# Patient Record
Sex: MALE | Race: WHITE | NOT HISPANIC OR LATINO | Employment: STUDENT | URBAN - METROPOLITAN AREA
[De-identification: names, ages, dates, MRNs, and addresses within clinical notes are randomized per-mention and may not be internally consistent; named-entity substitution may affect disease eponyms.]

---

## 2024-03-25 ENCOUNTER — OFFICE VISIT (OUTPATIENT)
Dept: ORTHOPEDIC SURGERY | Facility: CLINIC | Age: 24
End: 2024-03-25
Payer: COMMERCIAL

## 2024-03-25 VITALS — HEIGHT: 62 IN | WEIGHT: 120 LBS | BODY MASS INDEX: 22.08 KG/M2

## 2024-03-25 DIAGNOSIS — R29.898 TRANSIENT WEAKNESS OF LOWER EXTREMITY: Primary | ICD-10-CM

## 2024-03-25 DIAGNOSIS — G24.9 DYSTONIA: ICD-10-CM

## 2024-03-25 PROCEDURE — 99203 OFFICE O/P NEW LOW 30 MIN: CPT | Performed by: FAMILY MEDICINE

## 2024-03-25 PROCEDURE — 1036F TOBACCO NON-USER: CPT | Performed by: FAMILY MEDICINE

## 2024-03-25 NOTE — PROGRESS NOTES
History of Present Illness   Chief Complaint   Patient presents with    Left Lower Leg - Extremity Weakness     Noticed coordination is off with running, feels his stride is off     Right Lower Leg - Extremity Weakness     Noticed coordination is off with running, feels his stride is off        The patient is 24 y.o. male  here with a complaint of bilateral lower extremity complaints with running.  Patient is a avid runner, runs anywhere from 60 to 80 miles per week, has ran multiple marathons, says that he finished 150th of 49k runners in [place] marathon in the fall.  He says while he was training for this race on occasion he would get vague feelings of discoordination, limb twitching and spasming when he was running at his marathon pace for an extended period of time.  He said that he completed the marathon without any issues, had similar more sporadic symptoms in the winter but nothing consistent.  He says since the middle of February his symptoms have now become more consistent's, anytime that he runs a sub-6-minute mile he will start to develop symptoms, initially it was after 6 to 8 miles but now he says that after a mile or so he will get onset of symptoms within a mile or so of running at his faster pace.  He denies any pain at rest or other symptoms, he denies any muscle fasciculations at rest.  He was seen at his [facility], he is in medical school at [employer], they recommended outpatient orthopedic follow-up.    No past medical history on file.    Medication Documentation Review Audit    **Prior to Admission medications have not yet been reviewed**         No Known Allergies    Social History     Socioeconomic History    Marital status: Single     Spouse name: Not on file    Number of children: Not on file    Years of education: Not on file    Highest education level: Not on file   Occupational History    Not on file   Tobacco Use    Smoking status: Never    Smokeless tobacco: Never   Substance  and Sexual Activity    Alcohol use: Never    Drug use: Never    Sexual activity: Not on file   Other Topics Concern    Not on file   Social History Narrative    Not on file     Social Determinants of Health     Financial Resource Strain: Not on file   Food Insecurity: Not on file   Transportation Needs: Not on file   Physical Activity: Not on file   Stress: Not on file   Social Connections: Not on file   Intimate Partner Violence: Not on file   Housing Stability: Not on file       No past surgical history on file.       Review of Systems   GENERAL: Negative  GI: Negative  MUSCULOSKELETAL: See HPI  SKIN: Negative  NEURO:  Negative     Physical Exam:    General/Constitutional: well appearing, no distress, appears stated age  HEENT: sclera clear  Respiratory: non labored breathing  Vascular: No edema, swelling or tenderness, except as noted in detailed exam.  Integumentary: No impressive skin lesions present, except as noted in detailed exam.  Neurological:  Alert and oriented   Psychological:  Normal mood and affect.  Musculoskeletal: Normal, except as noted in detailed exam and in HPI.  Normal gait, unassisted    Bilateral lower extremity exam reveals normal-appearing bilateral lower extremities, there is normal muscle tone, no atrophy, no muscle fasciculations, there is no areas of tenderness to palpation at either lower extremity.  There is no lower extremity pathologic reflexes, there is no notable ligamentous laxity at either knee, no motor deficits or pain with strength testing at the knees hips or ankles.  He has sensation intact to light touch throughout bilateral lower extremities.       Imaging imaging today      Assessment   1. Transient weakness of lower extremity  EMG & nerve conduction      2. Dystonia  EMG & nerve conduction        Lower extremity muscle dystonia/weakness exacerbated by running at high pace for long distances, discussed possibility of runners dystonia as diagnosis versus muscle  imbalance related to gait    Plan: Discussed differential diagnosis, further workup and treatment.  I would like to obtain an EMG to evaluate for any muscle/nerve pathology that could be contributing to his symptoms.  In the meantime he will continue to avoid running at a pace that may aggravate his symptoms.  We discussed possibility of gait analysis as next steps in management pending EMG results

## 2024-04-03 ENCOUNTER — HOSPITAL ENCOUNTER (OUTPATIENT)
Dept: NEUROLOGY | Facility: HOSPITAL | Age: 24
Discharge: HOME | End: 2024-04-03
Payer: COMMERCIAL

## 2024-04-03 DIAGNOSIS — G24.9 DYSTONIA: ICD-10-CM

## 2024-04-03 DIAGNOSIS — R29.898 TRANSIENT WEAKNESS OF LOWER EXTREMITY: ICD-10-CM

## 2024-04-03 DIAGNOSIS — R29.898 TRANSIENT WEAKNESS OF LOWER EXTREMITY: Primary | ICD-10-CM

## 2024-04-03 PROCEDURE — 95910 NRV CNDJ TEST 7-8 STUDIES: CPT | Performed by: STUDENT IN AN ORGANIZED HEALTH CARE EDUCATION/TRAINING PROGRAM

## 2024-04-03 PROCEDURE — 95886 MUSC TEST DONE W/N TEST COMP: CPT | Performed by: STUDENT IN AN ORGANIZED HEALTH CARE EDUCATION/TRAINING PROGRAM

## 2024-04-09 ENCOUNTER — EVALUATION (OUTPATIENT)
Dept: PHYSICAL THERAPY | Facility: CLINIC | Age: 24
End: 2024-04-09
Payer: COMMERCIAL

## 2024-04-09 DIAGNOSIS — R29.898 WEAKNESS OF LEFT HIP: ICD-10-CM

## 2024-04-09 DIAGNOSIS — G24.9 DYSTONIA: ICD-10-CM

## 2024-04-09 DIAGNOSIS — R29.898 TRANSIENT WEAKNESS OF LOWER EXTREMITY: Primary | ICD-10-CM

## 2024-04-09 PROCEDURE — 97161 PT EVAL LOW COMPLEX 20 MIN: CPT | Mod: GP | Performed by: PHYSICAL THERAPIST

## 2024-04-09 NOTE — PROGRESS NOTES
Physical Therapy Orthopedic Examination Note    Patient Name: James Martinez  MRN Number: [id number]  Initial Examination Date:  Referring Clinician:  Reason for Visit:    Time Calculation  Start Time: 0402  Stop Time: 0440  Time Calculation (min): 38 min    Insurance  Visit Number: 1   Approved Visits:  Certification/ POC Period:  Coverage: Payor: AETNA / Plan: AETNA  HEALTHCARE / Product Type: *No Product type* /     Precautions/ History      Problem List  1. Transient weakness of lower extremity  Referral to Physical Therapy    Follow Up In Physical Therapy      2. Dystonia  Referral to Physical Therapy    Follow Up In Physical Therapy      3. Weakness of left hip          Subjective  Nerve conduction study was done last week, unremarkable. Started noticing issues with coordination with lower extremities while running and has been an issue for the last 2 months. 60-65 miles a weeks is his normal run. At faster paces he notices issues with his running (dystonia). It used to be after 13 miles of running, now it happens at a mile or less every time he runs at faster paces. Leg bumps into the other one with random contraction of the hip flexors, his ankle has a lack of coordination. Does not feel unstable or weak, has not fallen from this. 10-30 seconds of a rest and the symptoms go away and he can resume running. Symptoms do not seem neurological. 7 or so hours of sleep a night right now. Does try to keep a healthy diet. Denies pain, numbness in his legs. Occasional tingling but not really. He is doing weights about 6 days a week, on the 7th day he does primarily upper body. Approximately 6 weeks ago he started doing weekly lower body strengthening one time a week.     Aggravating Factors: Cannot pinpoint anything that makes it better or worse.  Relieving Factors: Trail running.     Pain scale:   0/10 maximum in the last week    Inspection  Treadmill Running Screen, speed 10.5 mph, 8 min of running.      Objective    Hip Strength Testing  Left  Right  Comments  Flexion   5/5  5/5  Extension  5/5  5/5  Abduction  4+/5  5/5  Adduction  4+/5  5/5    Assessment  Patient presents to physical therapy with concerns for lower extremity dystonia which occurs after running for short durations of time, this was previously an issue but took much longer to present itself while running.  Patient will be referred to a colleague for additional running assessment and therapeutic exercises to address hip weakness.    Problem List  - Difficulties running  - left hip weakness    Plan  Planned interventions include: Therapeutic exercise  and Gait Training. Neuromuscular Re-education. Running screen/ training. Left hip strengthening.     1 Visits/ Week for 5-6 Visits    Goals  - Full return to running without difficulty or dystonic movement  - Increase left hip strength testing equal to 5/5 for improvements in LE stability when running for extended periods of time.      Plan developed in agreement with patient.

## 2024-04-15 ENCOUNTER — LAB (OUTPATIENT)
Dept: LAB | Facility: LAB | Age: 24
End: 2024-04-15
Payer: COMMERCIAL

## 2024-04-15 ENCOUNTER — TREATMENT (OUTPATIENT)
Dept: PHYSICAL THERAPY | Facility: CLINIC | Age: 24
End: 2024-04-15
Payer: COMMERCIAL

## 2024-04-15 DIAGNOSIS — R29.898 TRANSIENT WEAKNESS OF LOWER EXTREMITY: ICD-10-CM

## 2024-04-15 DIAGNOSIS — G24.9 DYSTONIA: ICD-10-CM

## 2024-04-15 DIAGNOSIS — R29.898 WEAKNESS OF BOTH HIPS: Primary | ICD-10-CM

## 2024-04-15 LAB
ALBUMIN SERPL BCP-MCNC: 4.5 G/DL (ref 3.4–5)
ALP SERPL-CCNC: 56 U/L (ref 33–120)
ALT SERPL W P-5'-P-CCNC: 26 U/L (ref 10–52)
ANION GAP SERPL CALC-SCNC: 12 MMOL/L (ref 10–20)
AST SERPL W P-5'-P-CCNC: 29 U/L (ref 9–39)
BILIRUB SERPL-MCNC: 0.7 MG/DL (ref 0–1.2)
BUN SERPL-MCNC: 23 MG/DL (ref 6–23)
CALCIUM SERPL-MCNC: 9.8 MG/DL (ref 8.6–10.6)
CHLORIDE SERPL-SCNC: 106 MMOL/L (ref 98–107)
CO2 SERPL-SCNC: 29 MMOL/L (ref 21–32)
CREAT SERPL-MCNC: 1.1 MG/DL (ref 0.5–1.3)
EGFRCR SERPLBLD CKD-EPI 2021: >90 ML/MIN/1.73M*2
GLUCOSE SERPL-MCNC: 74 MG/DL (ref 74–99)
MAGNESIUM SERPL-MCNC: 2.03 MG/DL (ref 1.6–2.4)
POTASSIUM SERPL-SCNC: 5.1 MMOL/L (ref 3.5–5.3)
PROT SERPL-MCNC: 7 G/DL (ref 6.4–8.2)
SODIUM SERPL-SCNC: 142 MMOL/L (ref 136–145)

## 2024-04-15 PROCEDURE — 97110 THERAPEUTIC EXERCISES: CPT | Mod: GP

## 2024-04-15 PROCEDURE — 36415 COLL VENOUS BLD VENIPUNCTURE: CPT

## 2024-04-15 PROCEDURE — 80053 COMPREHEN METABOLIC PANEL: CPT

## 2024-04-15 PROCEDURE — 83735 ASSAY OF MAGNESIUM: CPT

## 2024-04-15 NOTE — PROGRESS NOTES
Physical Therapy  Physical Therapy Treatment Note    Patient Name: James Martinez  MRN: [id number]  Today's Date: 4/15/2024  Time Calculation  Start Time: 1210  Stop Time: 1250  Time Calculation (min): 40  PT Therapeutic Procedures Time Entry  Therapeutic Exercise Time Entry: 40               Insurance:  Visit number: 2 of 30  Authorization info: No auth  Insurance Type: Payor: AETNA / Plan: AETNA [place] [employer] / Product Type: *No Product type* /   Current Problem  1. Weakness of both hips        2. Transient weakness of lower extremity  Follow Up In Physical Therapy      3. Dystonia  Follow Up In Physical Therapy          Subjective:  Patient reports he has been performing hip abduction strengthening on his own since his last visit and running as tolerated w/o issue. Since starting strengthening his has noticed that he feels some SL stability issues, more so on his R side. Metabolic panel is not yet completed, was advised to wait until after a long run as that will provide him w/ more accurate results as to if he is deficient in electrolytes.       Objective:    ROM      Ankle AROM (Degrees)      (R)  (L)  Plantarflexion: WFL  WFL      Dorsiflexion: WFL  WFL     Inversion: WFL  WFL      Eversion: WFL  WFL            Strength Testing      Ankle    (R)  (L)  Plantarflexion: 5/5  5/5      Dorsiflexion: 5/5  5/5     Inversion: 5/5  5/5      Eversion: 5/5  5/5     SL calf raise  Fatigues at 50+ reps L side  Fatigues at 40 reps R side    Special Tests:  -Mercer sign B    Ankle Joint Mobility:   Subtalar and talocrural WNL       Treatments:     THERE EX 40'  Reviewed running video w/ patient   Steam boats 2x20 per side w/ yellow TB*  Side stepping w/ green TB around B LE's 4x15 per side  Cymro split squats x15 per side, x15 per side w/ 15lbs per side, 2x15 per side w/ 20 lb. KB off set holds for all*  Nordic hamstring curls 4x8*  KB RDLs w/ 20lb KB x12*    * = added to HEP.  Sheet with exercise descriptions and images  issued.  Skilled intervention utilized in the appropriate selection & application of above exercises.  Verbal and tactile cues provided for proper form and technique.  Pt. demonstrated appropriate form & verbalized understanding of optimal technique for above exercises.     Assessment:    Patient tolerated today's session w/ expected muscle fatigue. Medical test results and response to strengthening exercises make it appear that muscle weakness could be a contributing factor to patient's symptoms. Patient will benefit from continued PT to progress strengthening and assess response during longer runs to return to PLOF.        Plan:   Continue strength progression as tolerated, assess patient response to increased mileage.      Goals:       Kishor Allen, PT, ATC

## 2024-04-17 PROBLEM — G24.9 DYSTONIA: Status: ACTIVE | Noted: 2024-04-17

## 2024-05-01 ENCOUNTER — TREATMENT (OUTPATIENT)
Dept: PHYSICAL THERAPY | Facility: CLINIC | Age: 24
End: 2024-05-01
Payer: COMMERCIAL

## 2024-05-01 DIAGNOSIS — R29.898 WEAKNESS OF LEFT HIP: Primary | ICD-10-CM

## 2024-05-01 DIAGNOSIS — R29.898 TRANSIENT WEAKNESS OF LOWER EXTREMITY: ICD-10-CM

## 2024-05-01 DIAGNOSIS — G24.9 DYSTONIA: ICD-10-CM

## 2024-05-01 PROCEDURE — 97110 THERAPEUTIC EXERCISES: CPT | Mod: GP

## 2024-05-01 NOTE — PROGRESS NOTES
Physical Therapy  Physical Therapy Treatment Note    Patient Name: James Martinez  MRN: [id number]  Today's Date: 5/2/2024  Time Calculation  Start Time: 0850  Stop Time: 0935  Time Calculation (min): 45  PT Therapeutic Procedures Time Entry  Therapeutic Exercise Time Entry: 45               Insurance:  Visit number: 3 of 30  Authorization info: No auth  Insurance Type: Payor: AETNA / Plan: AETNA  HEALTHCARE / Product Type: *No Product type* /   Current Problem  1. Weakness of left hip        2. Transient weakness of lower extremity  Follow Up In Physical Therapy      3. Dystonia  Follow Up In Physical Therapy            Subjective:  Patient reports he has increased his mileage since his last visit. [place] he had been improving, recently w/ increased pace he has felt a return of familiar symptoms. 6:50 mile pace, around 5-6 miles in is when the symptoms have been occurring recently.       Objective:    ROM      Ankle AROM (Degrees)      (R)  (L)  Plantarflexion: WFL  WFL      Dorsiflexion: WFL  WFL     Inversion: WFL  WFL      Eversion: WFL  WFL            Strength Testing      Ankle    (R)  (L)  Plantarflexion: 5/5  5/5      Dorsiflexion: 5/5  5/5     Inversion: 5/5  5/5      Eversion: 5/5  5/5     SL calf raise  Fatigues at 50+ reps L side  Fatigues at 40 reps R side    Special Tests:  -Mercer sign B    Ankle Joint Mobility:   Subtalar and talocrural WNL       Treatments:     THERE EX 45'  Elliptical 5'  DL leg extension heavy x5 w/ 68 lbs, SL heavy w/ 36lbs x5 per side  DL leg light 36lbs x15, SL light w/ 28 lbs x15 per side  Dynamic warm up on own 5'  Isokinetic testing  SL shuttle jumps 3x15 per side w/ 43lbs     Isokinetic Strength Testing    Peak Torque Quads @ 60 deg/sec (goal for males: 100-125%, females: %)  R: 112.9 (90.4 % BW)  L: 91.7 (73.3 % BW)  Deficit: 29%  LSI: 81%    Peak Torque HS @ 60 deg/sec (goals for males: 60% BW, females: 50%)  R: 64.8 (51.8 % BW)  L: 66.3 (53 % BW)  Deficit: -2.3%    LSI: 102%    HS:Quad Ratio @ 60 deg/s (goals for males: 65%, females: 75%)  R: 57  L: 72    Peak Torque Quads @ 180 deg/sec  R: 70.8 (56.7 % BW)  L: 55.9 (44.7 % BW)  Deficit: 21%  LSI: 79%    Peak Torque HS @ 180 deg/sec  R: 48.8 (36.6 % BW)  L: 46.9 (37.5 % BW)  Deficit: 4%  LSI: 96%     HS:Quad Ratio @ 180 deg/sec  R: 69%  L: 84%    Peak Torque Quads @ 300 deg/sec   R: 66.6 (53.3 % BW)  L: 43.3 (34.7 % BW)  Deficit: 45%  LSI: 65%    Peak Torque HS @ 300 deg/sec   R: 38.4 (30.7 % BW)  L: 36.1 (28.9 % BW)  Deficit: 6%   LSI: 94%    HS:Quad Ratio @ 300 deg/s   R: 58%  L: 83%    Assessment:    Patient tolerated today's session w/ expected muscle fatigue. Isokinetic testing utilized to examine patients LE strength at different velocities. Data shows that he is weaker at increased velocities which could be a potential cause of his muscle spasms at increased paces.  Patient will benefit from ongoing PT services to progress LE strength and power activities to reach established goals to return to running activities at previous level.         Plan:   Power training w/ shuttle, Kbs. Continue Abductor strengthening.   Goals:  Active       Lower leg weakness and spasms       LT       Start:  05/02/24    Expected End:  06/13/24       1. Patient will tolerate running 5 miles at 9 min per mile pace w/o a return of familiar symptoms.  2. Strength: Isokinetic testing 90% or greater for hamstring and quad at 60º/sec and 300º/sec in order to return to running at increased pace w/o symptoms.  3. Patient will tolerate incorporation of power exercises into his workout routine w/o adverse reaction.   4. Patient will tolerate running 10 miles at 12 min per mile pace w/o a return of familiar symptoms.  5. Patient will return to running at 7 min per mile pace w/o adverse reaction or return of familiar symptoms.   6. Patient will demonstrate improved hip abductor and adductor strength during MMT to 5/5 B to improve running biomechanics.               Kishor Allen, PT, ATC

## 2024-05-02 ASSESSMENT — ENCOUNTER SYMPTOMS
DEPRESSION: 0
LOSS OF SENSATION IN FEET: 0
OCCASIONAL FEELINGS OF UNSTEADINESS: 0

## 2024-05-08 ENCOUNTER — TREATMENT (OUTPATIENT)
Dept: PHYSICAL THERAPY | Facility: CLINIC | Age: 24
End: 2024-05-08
Payer: COMMERCIAL

## 2024-05-08 DIAGNOSIS — R29.898 WEAKNESS OF BOTH HIPS: ICD-10-CM

## 2024-05-08 DIAGNOSIS — G24.9 DYSTONIA: ICD-10-CM

## 2024-05-08 DIAGNOSIS — R29.898 TRANSIENT WEAKNESS OF LOWER EXTREMITY: Primary | ICD-10-CM

## 2024-05-08 PROCEDURE — 97110 THERAPEUTIC EXERCISES: CPT | Mod: GP

## 2024-05-08 NOTE — PROGRESS NOTES
"  Physical Therapy  Physical Therapy Treatment Note    Patient Name: James Martinez  MRN: [id number]  Today's Date: 5/8/2024  Time Calculation  Start Time: 0845  Stop Time: 0930  Time Calculation (min): 45  PT Therapeutic Procedures Time Entry  Therapeutic Exercise Time Entry: 45              Insurance:  Visit number: 4 of 30  Authorization info: No auth  Insurance Type: Payor: AETNA / Plan: AETNA  HEALTHCARE / Product Type: *No Product type* /   Current Problem  1. Transient weakness of lower extremity  Follow Up In Physical Therapy      2. Dystonia  Follow Up In Physical Therapy      3. Weakness of both hips                Subjective:  Patient reports he has had a good week since his last visit. He has been doing about 9-10 miles per day. On some days he has no issues, on other days he will feel his familiar symptoms around the 4 mile bala. Unable to pin down any particular trigger that brings on symptoms.        Objective:    ROM      Ankle AROM (Degrees)      (R)  (L)  Plantarflexion: WFL  WFL      Dorsiflexion: WFL  WFL     Inversion: WFL  WFL      Eversion: WFL  WFL            Strength Testing      Ankle    (R)  (L)  Plantarflexion: 5/5  5/5      Dorsiflexion: 5/5  5/5     Inversion: 5/5  5/5      Eversion: 5/5  5/5     SL calf raise  Fatigues at 50+ reps L side  Fatigues at 40 reps R side    Special Tests:  -Mercer sign B    Ankle Joint Mobility:   Subtalar and talocrural WNL       Treatments:     THERE EX 45'  Elliptical 7'  Eccentric shuttle leg press up DL, down SL for 4\" descents w/ 75 lbs x5 per side, w/ 87 lbs x5 per side, w/ 93lbs x5 per side, w/ 125lbs 2x5 per side  [place] planks short lever 3x12 per side w/ 4\" holds**  Nordic hamstring curls 5x5 w/ 4-5\" descent    **visible muscle spasms produced after several repetitions of copenhagen planks that produced inversion spasms in B ankles.    * = added to HEP.  Sheet with exercise descriptions and images issued.  Skilled intervention utilized in the " appropriate selection & application of above exercises.  Verbal and tactile cues provided for proper form and technique.  Pt. demonstrated appropriate form & verbalized understanding of optimal technique for above exercises.      Assessment:    Patient tolerated today's session w/ expected muscle fatigue and a production of familiar symptoms during copenhagen planks. Based off of familiar symptom production during copenhagen planks, muscle weakness/fatigue appears to be the likely cause of patient's symptoms w/ running. Patient will benefit from ongoing PT services to progress strength and power exercises as tolerated to reach established goals to return to previous level of running.         Plan:   Assess response to strength training on his own. Progress heavy eccentrics as tolerated for quads, hamstring, ab and adductors.    Goals:  Active       Lower leg weakness and spasms       LTGs       Start:  05/02/24    Expected End:  06/13/24       1. Patient will tolerate running 5 miles at 9 min per mile pace w/o a return of familiar symptoms.  2. Strength: Isokinetic testing 90% or greater for hamstring and quad at 60º/sec and 300º/sec in order to return to running at increased pace w/o symptoms.  3. Patient will tolerate incorporation of power exercises into his workout routine w/o adverse reaction.   4. Patient will tolerate running 10 miles at 12 min per mile pace w/o a return of familiar symptoms.  5. Patient will return to running at 7 min per mile pace w/o adverse reaction or return of familiar symptoms.   6. Patient will demonstrate improved hip abductor and adductor strength during MMT to 5/5 B to improve running biomechanics.              Kishor Allen, PT, ATC

## 2024-05-15 ENCOUNTER — TREATMENT (OUTPATIENT)
Dept: PHYSICAL THERAPY | Facility: CLINIC | Age: 24
End: 2024-05-15
Payer: COMMERCIAL

## 2024-05-15 DIAGNOSIS — G24.9 DYSTONIA: ICD-10-CM

## 2024-05-15 DIAGNOSIS — R29.898 WEAKNESS OF BOTH HIPS: Primary | ICD-10-CM

## 2024-05-15 DIAGNOSIS — R29.898 TRANSIENT WEAKNESS OF LOWER EXTREMITY: ICD-10-CM

## 2024-05-15 PROCEDURE — 97110 THERAPEUTIC EXERCISES: CPT | Mod: GP

## 2024-05-15 NOTE — PROGRESS NOTES
Physical Therapy  Physical Therapy Treatment Note    Patient Name: James Martinez  MRN: [id number]  Today's Date: 5/15/2024  Time Calculation  Start Time: 0845  Stop Time: 0930  Time Calculation (min): 45  PT Therapeutic Procedures Time Entry  Therapeutic Exercise Time Entry: 45              Insurance:  Visit number: 5 of 30  Authorization info: No auth  Insurance Type: Payor: AETNA / Plan: AETNA  HEALTHCARE / Product Type: *No Product type* /   Current Problem  1. Weakness of both hips        2. Transient weakness of lower extremity  Follow Up In Physical Therapy      3. Dystonia  Follow Up In Physical Therapy            Subjective:  Patient reports that he has been doing well since his last visit. He was able to increase his mile pace to 5:30 per mile for the last mile while running 10 total w/o a return of familiar symptoms.        Objective:    ROM      Ankle AROM (Degrees)      (R)  (L)  Plantarflexion: WFL  WFL      Dorsiflexion: WFL  WFL     Inversion: WFL  WFL      Eversion: WFL  WFL            Strength Testing  Hip Strength Testing               Left                  Right                Comments  Flexion                         5/5                   5/5  Extension                     5/5                   5/5  Abduction                    5/5                 5/5  Adduction                    5/5                 5/5      Ankle    (R)  (L)  Plantarflexion: 5/5  5/5      Dorsiflexion: 5/5  5/5     Inversion: 5/5  5/5      Eversion: 5/5  5/5     SL calf raise  Fatigues at 60 reps L side  Fatigues at 55 reps R side    Special Tests:  -Mercer sign B    Ankle Joint Mobility:   Subtalar and talocrural WNL       Treatments:     THERE EX 45'  Elliptical 9'  Seated hip IR w/ adductor ball squeeze and green TB around LE 3x12  Eccentric DL leg extension w/ 72# and 4' lowers 5x5*  Eccentric DL leg curl w/ 72# and 4 ' ascent 5x5*  Seated physioball kicks 4x20'  SL RDLs w/ 10# KB 4x12 per side*      * = added to HEP.   Sheet with exercise descriptions and images issued.  Skilled intervention utilized in the appropriate selection & application of above exercises.  Verbal and tactile cues provided for proper form and technique.  Pt. demonstrated appropriate form & verbalized understanding of optimal technique for above exercises.      Assessment:    Patient tolerated today's session w/ expected muscle fatigue and production of muscle spasms/twitches during eccentric exercises. Demonstrates improvements in hip and calf strength compared to time of evaluation and has been able to progress his runs over the past week w/o a return of familiar symptoms. Patient is progressing well, has reached almost all of his established goals and is adequate for discharge at this time from outpatient services to continue strengthening on his own.     Plan:   Discharge outpatient PT services. Patient is instructed to continue strengthening and call the office for another visit in 5-6 weeks if symptoms return or he plateaus.     Goals:  Active       Lower leg weakness and spasms       LTGs       Start:  05/02/24    Expected End:  06/13/24       1. Patient will tolerate running 5 miles at 9 min per mile pace w/o a return of familiar symptoms.  2. Strength: Isokinetic testing 90% or greater for hamstring and quad at 60º/sec and 300º/sec in order to return to running at increased pace w/o symptoms.  3. Patient will tolerate incorporation of power exercises into his workout routine w/o adverse reaction.   4. Patient will tolerate running 10 miles at 12 min per mile pace w/o a return of familiar symptoms.  5. Patient will return to running at 7 min per mile pace w/o adverse reaction or return of familiar symptoms.   6. Patient will demonstrate improved hip abductor and adductor strength during MMT to 5/5 B to improve running biomechanics.              Kishor Allen, PT, ATC

## 2024-06-18 ENCOUNTER — DOCUMENTATION (OUTPATIENT)
Dept: PHYSICAL THERAPY | Facility: CLINIC | Age: 24
End: 2024-06-18
Payer: COMMERCIAL

## 2024-06-18 NOTE — PROGRESS NOTES
Discharge Summary    Name: James Martinez  MRN: [id number]  : 2000  Date: 24    Discharge Summary: PT    Discharge Information: Date of discharge 2024, Date of last visit 5/15/2024, Date of evaluation 2024, Number of attended visits 5, Referred by Dr. Nelson, and Referred for transient weakness of lower extremities, dystonia    Therapy Summary: Patient attended 5 therapy visits including his initial evaluation between 2024 and 5/15/2024. Treatment consisted of therapeutic exercise to improve LE strength and power to reduce spasm like symptoms that he would experience during intense runs. Patient was able to progress back to running at an increased kassandra compared to his initial evaluation. He achieved all his STGs and progressed in his LTGs to where he is appropriate for discharge w/ instructions to continue HEP on his own for further LE strength and power development.    Discharge Status: discharged to self care w/ HEP and instructions to continue progressing return to running as tolerated.     Rehab Discharge Reason: Achieved all and/or the most significant goals(s)